# Patient Record
Sex: FEMALE | Race: WHITE | NOT HISPANIC OR LATINO | Employment: FULL TIME | URBAN - METROPOLITAN AREA
[De-identification: names, ages, dates, MRNs, and addresses within clinical notes are randomized per-mention and may not be internally consistent; named-entity substitution may affect disease eponyms.]

---

## 2017-01-11 ENCOUNTER — APPOINTMENT (OUTPATIENT)
Dept: LAB | Facility: HOSPITAL | Age: 57
End: 2017-01-11

## 2017-01-11 ENCOUNTER — OFFICE VISIT (OUTPATIENT)
Dept: FAMILY MEDICINE CLINIC | Facility: CLINIC | Age: 57
End: 2017-01-11

## 2017-01-11 VITALS
HEART RATE: 71 BPM | DIASTOLIC BLOOD PRESSURE: 78 MMHG | TEMPERATURE: 98 F | HEIGHT: 62 IN | SYSTOLIC BLOOD PRESSURE: 120 MMHG | WEIGHT: 190 LBS | OXYGEN SATURATION: 98 % | BODY MASS INDEX: 34.96 KG/M2

## 2017-01-11 DIAGNOSIS — M19.90 ARTHRITIS: ICD-10-CM

## 2017-01-11 DIAGNOSIS — Z00.00 ANNUAL VISIT FOR GENERAL ADULT MEDICAL EXAMINATION WITHOUT ABNORMAL FINDINGS: Primary | ICD-10-CM

## 2017-01-11 DIAGNOSIS — F41.9 ANXIETY: ICD-10-CM

## 2017-01-11 DIAGNOSIS — L50.2 URTICARIA DUE TO COLD: ICD-10-CM

## 2017-01-11 LAB
ALBUMIN SERPL-MCNC: 4.7 G/DL (ref 3.2–4.8)
ALBUMIN/GLOB SERPL: 1.6 G/DL (ref 1.5–2.5)
ALP SERPL-CCNC: 85 U/L (ref 25–100)
ALT SERPL W P-5'-P-CCNC: 33 U/L (ref 7–40)
ANION GAP SERPL CALCULATED.3IONS-SCNC: 14 MMOL/L (ref 3–11)
ARTICHOKE IGE QN: 159 MG/DL (ref 0–130)
AST SERPL-CCNC: 27 U/L (ref 0–33)
BILIRUB BLD-MCNC: NEGATIVE MG/DL
BILIRUB SERPL-MCNC: 0.8 MG/DL (ref 0.3–1.2)
BUN BLD-MCNC: 7 MG/DL (ref 9–23)
BUN/CREAT SERPL: 10 (ref 7–25)
CALCIUM SPEC-SCNC: 9.7 MG/DL (ref 8.7–10.4)
CHLORIDE SERPL-SCNC: 105 MMOL/L (ref 99–109)
CHOLEST SERPL-MCNC: 245 MG/DL (ref 0–200)
CLARITY, POC: CLEAR
CO2 SERPL-SCNC: 28 MMOL/L (ref 20–31)
COLOR UR: YELLOW
CREAT BLD-MCNC: 0.7 MG/DL (ref 0.6–1.3)
DEPRECATED RDW RBC AUTO: 39.9 FL (ref 37–54)
ERYTHROCYTE [DISTWIDTH] IN BLOOD BY AUTOMATED COUNT: 12.5 % (ref 11.3–14.5)
GFR SERPL CREATININE-BSD FRML MDRD: 86 ML/MIN/1.73
GLOBULIN UR ELPH-MCNC: 3 GM/DL
GLUCOSE BLD-MCNC: 86 MG/DL (ref 70–100)
GLUCOSE UR STRIP-MCNC: NEGATIVE MG/DL
HCT VFR BLD AUTO: 41 % (ref 34.5–44)
HCV AB SER DONR QL: NORMAL
HDLC SERPL-MCNC: 50 MG/DL (ref 40–60)
HGB BLD-MCNC: 13.8 G/DL (ref 11.5–15.5)
KETONES UR QL: NEGATIVE
LEUKOCYTE EST, POC: NEGATIVE
MCH RBC QN AUTO: 29.7 PG (ref 27–31)
MCHC RBC AUTO-ENTMCNC: 33.7 G/DL (ref 32–36)
MCV RBC AUTO: 88.2 FL (ref 80–99)
NITRITE UR-MCNC: NEGATIVE MG/ML
PH UR: 7 [PH] (ref 5–8)
PLATELET # BLD AUTO: 273 10*3/MM3 (ref 150–450)
PMV BLD AUTO: 9.3 FL (ref 6–12)
POTASSIUM BLD-SCNC: 4 MMOL/L (ref 3.5–5.5)
PROT SERPL-MCNC: 7.7 G/DL (ref 5.7–8.2)
PROT UR STRIP-MCNC: NEGATIVE MG/DL
RBC # BLD AUTO: 4.65 10*6/MM3 (ref 3.89–5.14)
RBC # UR STRIP: NEGATIVE /UL
SODIUM BLD-SCNC: 147 MMOL/L (ref 132–146)
SP GR UR: 1 (ref 1–1.03)
T4 SERPL-MCNC: 8.7 MCG/DL (ref 4.7–11.4)
TRIGL SERPL-MCNC: 215 MG/DL (ref 0–150)
TSH SERPL DL<=0.05 MIU/L-ACNC: 2.63 MIU/ML (ref 0.35–5.35)
UROBILINOGEN UR QL: NORMAL
WBC NRBC COR # BLD: 7.08 10*3/MM3 (ref 3.5–10.8)

## 2017-01-11 PROCEDURE — 81003 URINALYSIS AUTO W/O SCOPE: CPT | Performed by: FAMILY MEDICINE

## 2017-01-11 PROCEDURE — 86803 HEPATITIS C AB TEST: CPT | Performed by: FAMILY MEDICINE

## 2017-01-11 PROCEDURE — 36415 COLL VENOUS BLD VENIPUNCTURE: CPT | Performed by: FAMILY MEDICINE

## 2017-01-11 PROCEDURE — 99396 PREV VISIT EST AGE 40-64: CPT | Performed by: FAMILY MEDICINE

## 2017-01-11 PROCEDURE — 84436 ASSAY OF TOTAL THYROXINE: CPT | Performed by: FAMILY MEDICINE

## 2017-01-11 PROCEDURE — 80061 LIPID PANEL: CPT | Performed by: FAMILY MEDICINE

## 2017-01-11 PROCEDURE — 84443 ASSAY THYROID STIM HORMONE: CPT | Performed by: FAMILY MEDICINE

## 2017-01-11 PROCEDURE — 85027 COMPLETE CBC AUTOMATED: CPT | Performed by: FAMILY MEDICINE

## 2017-01-11 PROCEDURE — 80053 COMPREHEN METABOLIC PANEL: CPT | Performed by: FAMILY MEDICINE

## 2017-01-11 NOTE — PROGRESS NOTES
Subjective   Aidee Chiang is a 56 y.o. female    HPI Comments: Patient presents today for an annual physical examination.  She has a history of cold urticaria along with chronic allergic rhinitis.  Her cold urticaria has been significantly helped with daily see tach.  She has complaints today of anxiety and serious mood swings.  Unfortunately she is commuting to [place] for work every day and is also raising a grandchild which have added to her stress.  She complains of a tender spot in her right lower back and also pain in her hands at her first CMC joints bilaterally with occasional numbness of the first and second digits of the hands.    Anxiety   Symptoms include nervous/anxious behavior.       Back Pain     Carpal Tunnel   Associated symptoms include arthralgias.       The following portions of the patient's history were reviewed and updated as appropriate: allergies, current medications, past social history and problem list    Review of Systems   Constitutional: Negative.    HENT: Negative.    Eyes: Negative.    Respiratory: Negative.    Cardiovascular: Negative.    Gastrointestinal: Negative.    Endocrine: Negative.    Genitourinary: Negative.    Musculoskeletal: Positive for arthralgias and back pain.   Skin: Negative.    Allergic/Immunologic: Negative.    Neurological: Negative.    Hematological: Negative.    Psychiatric/Behavioral: Positive for agitation. The patient is nervous/anxious.    All other systems reviewed and are negative.      Objective     Vitals:    01/11/17 1330   BP: 120/78   Pulse: 71   Temp: 98 °F (36.7 °C)   SpO2: 98%       Physical Exam   Constitutional: She is oriented to person, place, and time. She appears well-developed and well-nourished.   HENT:   Head: Normocephalic and atraumatic.   Right Ear: External ear normal.   Left Ear: External ear normal.   Nose: Nose normal.   Mouth/Throat: Oropharynx is clear and moist.   Eyes: Conjunctivae and EOM are normal. Pupils are equal,  round, and reactive to light.   Neck: Normal range of motion. Neck supple. No thyromegaly present.   Cardiovascular: Normal rate, regular rhythm, normal heart sounds and intact distal pulses.    No murmur heard.  Pulmonary/Chest: Effort normal and breath sounds normal.   Abdominal: Soft. Bowel sounds are normal. She exhibits no mass. There is no tenderness.   Musculoskeletal: Normal range of motion. She exhibits no edema.        Right wrist: She exhibits tenderness and bony tenderness. She exhibits normal range of motion, no swelling and no crepitus.        Left wrist: She exhibits tenderness and bony tenderness. She exhibits normal range of motion, no swelling and no crepitus.   Lymphadenopathy:     She has no cervical adenopathy.   Neurological: She is alert and oriented to person, place, and time. She has normal reflexes. No cranial nerve deficit.   Skin: Skin is warm and dry.   Psychiatric: She has a normal mood and affect. Her behavior is normal.   Nursing note and vitals reviewed.      Assessment/Plan   Problem List Items Addressed This Visit     None      Visit Diagnoses     Annual visit for general adult medical examination without abnormal findings    -  Primary    Relevant Orders    POC Urinalysis Dipstick, Automated (Completed)    CBC (No Diff)    Hepatitis C Antibody    Lipid Panel    TSH    T4    Comprehensive Metabolic Panel    Anxiety        Relevant Medications    sertraline (ZOLOFT) 50 MG tablet    Arthritis        Urticaria due to cold            Patient is advised to try over-the-counter Aleve or generic equivalent once or twice a day for 2-3 weeks for her CMC arthritis and back discomfort.    Patient is counseled during today's visit regarding preventative health measures to include use of seatbelts, medication safety, healthy diet and regular exercise.

## 2017-03-09 DIAGNOSIS — F41.9 ANXIETY: ICD-10-CM

## 2017-04-19 ENCOUNTER — OFFICE VISIT (OUTPATIENT)
Dept: FAMILY MEDICINE CLINIC | Facility: CLINIC | Age: 57
End: 2017-04-19

## 2017-04-19 VITALS
SYSTOLIC BLOOD PRESSURE: 124 MMHG | WEIGHT: 192 LBS | HEIGHT: 62 IN | HEART RATE: 67 BPM | BODY MASS INDEX: 35.33 KG/M2 | DIASTOLIC BLOOD PRESSURE: 74 MMHG | TEMPERATURE: 97.2 F | OXYGEN SATURATION: 97 %

## 2017-04-19 DIAGNOSIS — M79.89 SWELLING OF BOTH LOWER EXTREMITIES: Primary | ICD-10-CM

## 2017-04-19 DIAGNOSIS — I83.893 VARICOSE VEINS OF BOTH LEGS WITH EDEMA: ICD-10-CM

## 2017-04-19 PROCEDURE — 99213 OFFICE O/P EST LOW 20 MIN: CPT | Performed by: FAMILY MEDICINE

## 2017-04-19 NOTE — PROGRESS NOTES
Subjective   Aidee Chiang is a 56 y.o. female    HPI Comments: Patient presents with swelling of the right lower extremity.  She has not had any trauma.  There is slight swelling but less pronounced in the left lower extremity.  She reports that this is a new symptom for her.  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  She does not have to commute to [place] everyday which is a good thing.    Leg Swelling   Pertinent negatives include no arthralgias, chest pain, chills, coughing, fatigue, fever, myalgias, numbness or weakness.       The following portions of the patient's history were reviewed and updated as appropriate: allergies, current medications, past social history and problem list    Review of Systems   Constitutional: Negative for chills, fatigue, fever and unexpected weight change.   Respiratory: Negative for cough, shortness of breath and wheezing.    Cardiovascular: Positive for leg swelling. Negative for chest pain and palpitations.   Genitourinary: Negative.    Musculoskeletal: Negative for arthralgias and myalgias.   Neurological: Negative for dizziness, weakness, light-headedness and numbness.       Objective     Vitals:    04/19/17 0808   BP: 124/74   Pulse: 67   Temp: 97.2 °F (36.2 °C)   SpO2: 97%       Physical Exam   Constitutional: She is oriented to person, place, and time. She appears well-developed and well-nourished.   HENT:   Head: Normocephalic and atraumatic.   Cardiovascular: Normal rate and regular rhythm.  Exam reveals no gallop.    No murmur heard.  Pulmonary/Chest: Effort normal and breath sounds normal. She has no wheezes. She has no rales.   Abdominal: Soft. She exhibits no mass. There is no tenderness.   Musculoskeletal:        Right lower leg: She exhibits swelling.   Patient has an area of swelling and fullness in the medial proximal area of both lower legs more pronounced on the right.  She appears to  have a significant area of varicosities underlying this area of swelling.   Neurological: She is alert and oriented to person, place, and time.   Skin: Skin is warm and dry.   Psychiatric: She has a normal mood and affect. Her behavior is normal.   Nursing note and vitals reviewed.      Assessment/Plan   Problem List Items Addressed This Visit     None      Visit Diagnoses     Swelling of both lower extremities    -  Primary    Relevant Orders    Duplex Venous Lower Extremity - Bilateral CAR    Varicose veins of both legs with edema        Relevant Orders    Duplex Venous Lower Extremity - Bilateral CAR

## 2017-04-25 ENCOUNTER — HOSPITAL ENCOUNTER (OUTPATIENT)
Dept: CARDIOLOGY | Facility: HOSPITAL | Age: 57
Discharge: HOME | End: 2017-04-25
Admitting: FAMILY MEDICINE

## 2017-04-25 DIAGNOSIS — I83.893 VARICOSE VEINS OF BOTH LEGS WITH EDEMA: ICD-10-CM

## 2017-04-25 DIAGNOSIS — M79.89 SWELLING OF BOTH LOWER EXTREMITIES: ICD-10-CM

## 2017-04-25 LAB
BH CV LOWER VASCULAR LEFT COMMON FEMORAL AUGMENT: NORMAL
BH CV LOWER VASCULAR LEFT COMMON FEMORAL COMPETENT: NORMAL
BH CV LOWER VASCULAR LEFT COMMON FEMORAL COMPRESS: NORMAL
BH CV LOWER VASCULAR LEFT COMMON FEMORAL PHASIC: NORMAL
BH CV LOWER VASCULAR LEFT COMMON FEMORAL SPONT: NORMAL
BH CV LOWER VASCULAR LEFT DISTAL FEMORAL COMPRESS: NORMAL
BH CV LOWER VASCULAR LEFT GASTRONEMIUS COMPRESS: NORMAL
BH CV LOWER VASCULAR LEFT GREATER SAPH AK COMPRESS: NORMAL
BH CV LOWER VASCULAR LEFT GREATER SAPH BK COMPRESS: NORMAL
BH CV LOWER VASCULAR LEFT MID FEMORAL AUGMENT: NORMAL
BH CV LOWER VASCULAR LEFT MID FEMORAL COMPETENT: NORMAL
BH CV LOWER VASCULAR LEFT MID FEMORAL COMPRESS: NORMAL
BH CV LOWER VASCULAR LEFT MID FEMORAL PHASIC: NORMAL
BH CV LOWER VASCULAR LEFT MID FEMORAL SPONT: NORMAL
BH CV LOWER VASCULAR LEFT PERONEAL COMPRESS: NORMAL
BH CV LOWER VASCULAR LEFT POPLITEAL AUGMENT: NORMAL
BH CV LOWER VASCULAR LEFT POPLITEAL COMPETENT: NORMAL
BH CV LOWER VASCULAR LEFT POPLITEAL COMPRESS: NORMAL
BH CV LOWER VASCULAR LEFT POPLITEAL PHASIC: NORMAL
BH CV LOWER VASCULAR LEFT POPLITEAL SPONT: NORMAL
BH CV LOWER VASCULAR LEFT POSTERIOR TIBIAL COMPRESS: NORMAL
BH CV LOWER VASCULAR LEFT PROXIMAL FEMORAL COMPRESS: NORMAL
BH CV LOWER VASCULAR LEFT SAPHENOFEMORAL JUNCTION AUGMENT: NORMAL
BH CV LOWER VASCULAR LEFT SAPHENOFEMORAL JUNCTION COMPETENT: NORMAL
BH CV LOWER VASCULAR LEFT SAPHENOFEMORAL JUNCTION COMPRESS: NORMAL
BH CV LOWER VASCULAR LEFT SAPHENOFEMORAL JUNCTION PHASIC: NORMAL
BH CV LOWER VASCULAR LEFT SAPHENOFEMORAL JUNCTION SPONT: NORMAL
BH CV LOWER VASCULAR RIGHT COMMON FEMORAL AUGMENT: NORMAL
BH CV LOWER VASCULAR RIGHT COMMON FEMORAL COMPETENT: NORMAL
BH CV LOWER VASCULAR RIGHT COMMON FEMORAL COMPRESS: NORMAL
BH CV LOWER VASCULAR RIGHT COMMON FEMORAL PHASIC: NORMAL
BH CV LOWER VASCULAR RIGHT COMMON FEMORAL SPONT: NORMAL
BH CV LOWER VASCULAR RIGHT DISTAL FEMORAL COMPRESS: NORMAL
BH CV LOWER VASCULAR RIGHT GASTRONEMIUS COMPRESS: NORMAL
BH CV LOWER VASCULAR RIGHT GREATER SAPH AK COMPRESS: NORMAL
BH CV LOWER VASCULAR RIGHT GREATER SAPH BK COMPRESS: NORMAL
BH CV LOWER VASCULAR RIGHT MID FEMORAL AUGMENT: NORMAL
BH CV LOWER VASCULAR RIGHT MID FEMORAL COMPETENT: NORMAL
BH CV LOWER VASCULAR RIGHT MID FEMORAL COMPRESS: NORMAL
BH CV LOWER VASCULAR RIGHT MID FEMORAL PHASIC: NORMAL
BH CV LOWER VASCULAR RIGHT MID FEMORAL SPONT: NORMAL
BH CV LOWER VASCULAR RIGHT PERONEAL COMPRESS: NORMAL
BH CV LOWER VASCULAR RIGHT POPLITEAL AUGMENT: NORMAL
BH CV LOWER VASCULAR RIGHT POPLITEAL COMPETENT: NORMAL
BH CV LOWER VASCULAR RIGHT POPLITEAL COMPRESS: NORMAL
BH CV LOWER VASCULAR RIGHT POPLITEAL PHASIC: NORMAL
BH CV LOWER VASCULAR RIGHT POPLITEAL SPONT: NORMAL
BH CV LOWER VASCULAR RIGHT POSTERIOR TIBIAL COMPRESS: NORMAL
BH CV LOWER VASCULAR RIGHT PROXIMAL FEMORAL COMPRESS: NORMAL
BH CV LOWER VASCULAR RIGHT SAPHENOFEMORAL JUNCTION AUGMENT: NORMAL
BH CV LOWER VASCULAR RIGHT SAPHENOFEMORAL JUNCTION COMPETENT: NORMAL
BH CV LOWER VASCULAR RIGHT SAPHENOFEMORAL JUNCTION COMPRESS: NORMAL
BH CV LOWER VASCULAR RIGHT SAPHENOFEMORAL JUNCTION PHASIC: NORMAL
BH CV LOWER VASCULAR RIGHT SAPHENOFEMORAL JUNCTION SPONT: NORMAL

## 2017-04-25 PROCEDURE — 93970 EXTREMITY STUDY: CPT | Performed by: INTERNAL MEDICINE

## 2017-04-25 PROCEDURE — 93970 EXTREMITY STUDY: CPT

## 2017-04-26 DIAGNOSIS — I83.893 VARICOSE VEINS OF BOTH LEGS WITH EDEMA: Primary | ICD-10-CM

## 2017-07-17 DIAGNOSIS — F41.9 ANXIETY: ICD-10-CM

## 2017-07-17 NOTE — TELEPHONE ENCOUNTER
Patient has new insurance and doesn't use mail order anymore. Needs new script sent to local pharmacy.

## 2018-01-31 ENCOUNTER — OFFICE VISIT (OUTPATIENT)
Dept: FAMILY MEDICINE CLINIC | Facility: CLINIC | Age: 58
End: 2018-01-31

## 2018-01-31 VITALS
TEMPERATURE: 99 F | HEIGHT: 62 IN | BODY MASS INDEX: 35.59 KG/M2 | DIASTOLIC BLOOD PRESSURE: 84 MMHG | OXYGEN SATURATION: 98 % | HEART RATE: 87 BPM | SYSTOLIC BLOOD PRESSURE: 118 MMHG | WEIGHT: 193.4 LBS

## 2018-01-31 DIAGNOSIS — M25.561 ACUTE PAIN OF RIGHT KNEE: Primary | ICD-10-CM

## 2018-01-31 PROCEDURE — 99213 OFFICE O/P EST LOW 20 MIN: CPT | Performed by: FAMILY MEDICINE

## 2018-01-31 NOTE — PROGRESS NOTES
Subjective   Aidee Chiang is a 57 y.o. female    HPI Comments: Patient presents today with pain in the right knee along with slight swelling.  The knee has been tender to palpation.  She reports that she was walking with her granddaughter and walking faster than usual when she felt something give or pop in her right knee.  She has had pain since then.  She has had long-standing mild intermittent pain in both knees but reports that this is significantly different.  She's had no previous serious knee injuries and no surgeries. Has appointment already scheduled with Ortho.    Knee Pain    Pertinent negatives include no numbness.       The following portions of the patient's history were reviewed and updated as appropriate: allergies, current medications, past social history and problem list    Review of Systems   Constitutional: Negative.    Musculoskeletal: Positive for arthralgias, gait problem, joint swelling and myalgias.   Skin: Negative.    Neurological: Negative for weakness and numbness.   Hematological: Negative for adenopathy. Does not bruise/bleed easily.       Objective     Vitals:    01/31/18 1153   BP: 118/84   Pulse: 87   Temp: 99 °F (37.2 °C)   SpO2: 98%       Physical Exam   Constitutional: She is oriented to person, place, and time. She appears well-developed and well-nourished.   Musculoskeletal:        Right knee: She exhibits decreased range of motion and swelling. She exhibits no effusion, no ecchymosis, no deformity, no erythema, no LCL laxity and no MCL laxity. Tenderness found. Medial joint line tenderness noted.   Neurological: She is alert and oriented to person, place, and time. She has normal reflexes.   Skin: Skin is warm and dry. No rash noted.   Psychiatric: She has a normal mood and affect. Her behavior is normal.   Nursing note and vitals reviewed.      Assessment/Plan   Problem List Items Addressed This Visit     None      Visit Diagnoses     Acute pain of right knee    -  Primary     Relevant Orders    MRI knee right  wo contrast

## 2018-02-07 ENCOUNTER — HOSPITAL ENCOUNTER (OUTPATIENT)
Dept: MRI IMAGING | Facility: HOSPITAL | Age: 58
Discharge: HOME | End: 2018-02-07
Admitting: FAMILY MEDICINE

## 2018-02-07 DIAGNOSIS — M25.561 ACUTE PAIN OF RIGHT KNEE: ICD-10-CM

## 2018-02-07 PROCEDURE — 73721 MRI JNT OF LWR EXTRE W/O DYE: CPT

## 2018-02-08 ENCOUNTER — TELEPHONE (OUTPATIENT)
Dept: FAMILY MEDICINE CLINIC | Facility: CLINIC | Age: 58
End: 2018-02-08

## 2018-02-08 NOTE — TELEPHONE ENCOUNTER
MRI report has not been confirmed but shows a medial meniscus tear.  Will need oral total referral.  Does she have a preference of the orthopedist?

## 2018-02-08 NOTE — TELEPHONE ENCOUNTER
----- Message from Martina Quinones sent at 2/8/2018  2:30 PM EST -----  Contact: PATIENT  SHE HAD MRI YESTERDAY AND WANTED TO KNOW IF THE RESULTS WERE BACK AND IF SOMEONE CAN CALL HER WITH THOSE RESULTS. THANKS [phone]

## 2018-03-01 ENCOUNTER — OFFICE VISIT (OUTPATIENT)
Dept: ORTHOPEDIC SURGERY | Facility: CLINIC | Age: 58
End: 2018-03-01

## 2018-03-01 VITALS
WEIGHT: 188.49 LBS | HEIGHT: 61 IN | HEART RATE: 90 BPM | DIASTOLIC BLOOD PRESSURE: 81 MMHG | BODY MASS INDEX: 35.59 KG/M2 | SYSTOLIC BLOOD PRESSURE: 163 MMHG

## 2018-03-01 DIAGNOSIS — M25.561 RIGHT KNEE PAIN, UNSPECIFIED CHRONICITY: Primary | ICD-10-CM

## 2018-03-01 DIAGNOSIS — S83.221A PERIPHERAL TEAR OF MEDIAL MENISCUS OF RIGHT KNEE AS CURRENT INJURY, INITIAL ENCOUNTER: ICD-10-CM

## 2018-03-01 DIAGNOSIS — M70.51 PES ANSERINUS BURSITIS OF BOTH KNEES: ICD-10-CM

## 2018-03-01 DIAGNOSIS — M70.52 PES ANSERINUS BURSITIS OF BOTH KNEES: ICD-10-CM

## 2018-03-01 PROCEDURE — 99203 OFFICE O/P NEW LOW 30 MIN: CPT | Performed by: ORTHOPAEDIC SURGERY

## 2018-03-01 NOTE — PROGRESS NOTES
[facility] Note    Subjective     Pain of the Right Knee (Right knee pain for several years with no known injury. Typically the pain is a throbbing pain. No pain at the moment, only a throbbing pain. Standing for long periods of time increases the pain.)      CURTIS Chiang is a 57 y.o. female. Patient's here for several weeks of right knee pain.  Taking one of her grandchildren to the doctor and felt sudden onset of right thigh and leg pain that did not allow her to walk.  She limped around for a couple of weeks after that.  She went to her PCP who ordered an MRI.  She is here for further evaluation and treatment.  Patient tells me her knee is much better over the last several days.  She hurts on the medial side of both knees.  She denies twisting pain or mechanical disturbance.     Past Medical History:   Diagnosis Date   • Allergic       Past Surgical History:   Procedure Laterality Date   • AXILLARY LYMPH NODE BIOPSY/EXCISION Right 2008   • LYMPHADENECTOMY     • TUBAL ABDOMINAL LIGATION     • WRIST GANGLION EXCISION        Family History   Problem Relation Age of Onset   • Cancer Mother    • Hypertension Mother    • Heart disease Mother    • Alzheimer's disease Father    • Hyperlipidemia Father    • Diabetes Father      Social History     Social History   • Marital status:      Spouse name: N/A   • Number of children: N/A   • Years of education: N/A     Occupational History   • Not on file.     Social History Main Topics   • Smoking status: Never Smoker   • Smokeless tobacco: Never Used   • Alcohol use No   • Drug use: No   • Sexual activity: Defer     Other Topics Concern   • Not on file     Social History Narrative          Current Outpatient Prescriptions on File Prior to Visit   Medication Sig Dispense Refill   • cetirizine (ZYRTEC ALLERGY) 10 MG tablet Take 10 mg by mouth Daily.     • sertraline (ZOLOFT) 50 MG tablet Take 1 tablet by mouth Daily. Take after  "supper 90 tablet 3     No current facility-administered medications on file prior to visit.       Allergies   Allergen Reactions   • Other         The following portions of the patient's history were reviewed and updated as appropriate: allergies, current medications, past family history, past medical history, past social history, past surgical history and problem list.    Review of Systems   Constitutional: Positive for fatigue.   HENT: Negative.    Eyes: Negative.    Respiratory: Negative.    Cardiovascular: Positive for leg swelling.   Gastrointestinal: Negative.    Endocrine: Negative.    Genitourinary: Negative.    Musculoskeletal: Positive for arthralgias.   Skin: Negative.    Allergic/Immunologic: Negative.    Neurological: Positive for numbness.   Hematological: Negative.    Psychiatric/Behavioral: Negative.         Objective      Physical Exam  /81  Pulse 90  Ht 154.9 cm (61\")  Wt 85.5 kg (188 lb 7.9 oz)  BMI 35.62 kg/m2    Body mass index is 35.62 kg/(m^2).    General  Mental Status - alert  General Appearance - cooperative, well groomed, not in acute distress  Orientation - Oriented X3  Build & Nutrition - well developed and well nourished  Posture - normal posture  Gait - normal gait     Integumentary  Global Assessment  Examination of related systems reveals - no lymphadenopathy  General Characteristics  Overall examination of the patient's skin reveals - no rashes, no evidence of scars, no suspicious lesions and no bruises.  Color - normal coloration of skin.    Ortho Exam  Peripheral Vascular:    Upper Extremity:   Inspection:  Left--no cyanotic nail beds Right--no cyanotic nail beds   Bilateral:  Pink nail beds with brisk capillary refill   Palpation:  Bilateral radial pulse normal    Musculoskeletal:  Global Assessment:  Overall assessment of Lower Extremity Muscle Strength and Tone:    Right quadriceps--5/5  Right hamstrings--5/5  Right tibialis anterior--5/5  Right gastroc " soleus--5/5  Right EHL--5/5    Lower Extremity:  Knee/Patella:  No digital clubbing or cyanosis.    Examination of right knee reveals:  Normal deep tendon reflexes, coordination, strength, tone, sensation.  No known fractures or deformities.    Inspection and Palpation:      Right knee:  Tenderness:  Over medial joint line more than over the hamstring insertions on the tibia bilaterally  Effusion:  1+  Crepitus:  none  Pulses:  2+  Ecchymosis:  None  Warmth:  None     ROM:  Right:  Extension:0    Flexion:135  Left:  Extension:0     Flexion:135    Instability:      Right:  Lachman Test:  Negative, Varus stress test negative, Valgus stress test negative   Anterior Drawer Test:  Negative, Posterior Drawer Test:  Negative    Deformities/Malalignments/Discrepancies:    Left:  none  Right:  none    Functional Testing:  Right:  Yogesh's test:  Negative  Patella grind test:  Negative  Q-angle:  Normal  Apprehension Sign:  Negative    Imaging/Studies  X-rays taken in the office today as well as an MRI from [facility] from 2/7/2018 are reviewed.  We have reviewed the report as well.  Patient has a small medial meniscal tear with a small perimeniscal cyst.  No significant degenerative changes are appreciated.    Assessment:  1. Right knee pain, unspecified chronicity    2. Peripheral tear of medial meniscus of right knee as current injury, initial encounter    3. Pes anserinus bursitis of both knees        Plan:  1. Patient's bursitis seems to be the most likely etiology of her pain  2. Continue over-the-counter medication as needed for discomfort  3. We have recommended some hamstring stretching  4. With regards to her meniscal tear, does not seem to be the etiology of her discomfort and I like to observe that for now.  5. Follow-up in 3 weeks to see which direction this is going.  If she is worse, PT may be helpful.      Medical Decision Making  Management Options : over-the-counter medicine  Data/Risk: radiology tests  and independent visualization of imaging, lab tests, or EMG/NCV    Nelson Bullock MD  03/01/18  2:23 PM

## 2018-11-28 ENCOUNTER — OFFICE VISIT (OUTPATIENT)
Dept: FAMILY MEDICINE CLINIC | Facility: CLINIC | Age: 58
End: 2018-11-28

## 2018-11-28 VITALS
DIASTOLIC BLOOD PRESSURE: 60 MMHG | TEMPERATURE: 97.6 F | SYSTOLIC BLOOD PRESSURE: 120 MMHG | WEIGHT: 167 LBS | HEIGHT: 61 IN | BODY MASS INDEX: 31.53 KG/M2 | OXYGEN SATURATION: 98 % | HEART RATE: 72 BPM

## 2018-11-28 DIAGNOSIS — E78.2 MIXED HYPERLIPIDEMIA: Primary | ICD-10-CM

## 2018-11-28 PROCEDURE — 99213 OFFICE O/P EST LOW 20 MIN: CPT | Performed by: FAMILY MEDICINE

## 2018-11-28 RX ORDER — INFLUENZA A VIRUS A/SINGAPORE/GP1908/2015 IVR-180A (H1N1) ANTIGEN (PROPIOLACTONE INACTIVATED), INFLUENZA A VIRUS A/SINGAPORE/INFIMH-16-0019/2016 IVR-186 (H3N2) ANTIGEN (PROPIOLACTONE INACTIVATED), INFLUENZA B VIRUS B/MARYLAND/15/2016 ANTIGEN (PROPIOLACTONE INACTIVATED), AND INFLUENZA B VIRUS B/PHUKET/3073/2013 BVR-1B ANTIGEN (PROPIOLACTONE INACTIVATED) 15; 15; 15; 15 UG/.5ML; UG/.5ML; UG/.5ML; UG/.5ML
INJECTION, SUSPENSION INTRAMUSCULAR
Refills: 0 | COMMUNITY
Start: 2018-11-07 | End: 2019-05-08

## 2018-11-28 RX ORDER — HEPATITIS A VACCINE 1440 [IU]/ML
INJECTION, SUSPENSION INTRAMUSCULAR
Refills: 0 | COMMUNITY
Start: 2018-11-07 | End: 2019-05-08

## 2018-11-28 NOTE — PROGRESS NOTES
Subjective   Aidee Chiang is a 58 y.o. female    Chief Complaint    High cholesterol      History of Present Illness   Patient referred by her gynecologist after recent lab work revealed a total cholesterol of 251 LDL cholesterol 164 HDL cholesterol 56 and triglycerides 155.  Patient has lost significant amount of weight on a Weight Watchers diet that she started a couple months ago and would like to continue dieting to see if she can get her cholesterol down.  I gave her a low cholesterol diet that she can hopefully incorporate with her Weight Watchers.  I also recommended she add fish oil to see if that would help.      The following portions of the patient's history were reviewed and updated as appropriate: allergies, current medications, past social history and problem list    Review of Systems   Constitutional: Negative for chills, fatigue and fever.   Respiratory: Negative for cough, chest tightness, shortness of breath and wheezing.    Cardiovascular: Negative for chest pain, palpitations and leg swelling.   Gastrointestinal: Negative for abdominal pain, nausea and vomiting.   Endocrine: Negative for polydipsia, polyphagia and polyuria.   Genitourinary: Negative for dysuria, frequency and urgency.   Musculoskeletal: Negative for arthralgias, back pain and myalgias.   Skin: Negative for color change and rash.   Neurological: Negative for weakness and headaches.   Hematological: Negative for adenopathy. Does not bruise/bleed easily.       Objective     Vitals:    11/28/18 1329   BP: 120/60   Pulse: 72   Temp: 97.6 °F (36.4 °C)   SpO2: 98%       Physical Exam   Constitutional: She is oriented to person, place, and time. She appears well-developed and well-nourished.   HENT:   Head: Normocephalic.   Mouth/Throat: Oropharynx is clear and moist.   Eyes: Conjunctivae are normal. Pupils are equal, round, and reactive to light.   Neck: Neck supple. No thyromegaly present.   Cardiovascular: Normal rate and regular  rhythm.   Pulmonary/Chest: Effort normal.   Abdominal: Soft. Bowel sounds are normal. There is no tenderness.   Lymphadenopathy:     She has no cervical adenopathy.   Neurological: She is alert and oriented to person, place, and time.   Skin: Skin is warm and dry. No rash noted.   Psychiatric: She has a normal mood and affect. Her behavior is normal.   Nursing note and vitals reviewed.      Assessment/Plan   Problem List Items Addressed This Visit     None      Visit Diagnoses     Mixed hyperlipidemia    -  Primary

## 2019-03-20 ENCOUNTER — OFFICE VISIT (OUTPATIENT)
Dept: FAMILY MEDICINE CLINIC | Facility: CLINIC | Age: 59
End: 2019-03-20

## 2019-03-20 ENCOUNTER — LAB REQUISITION (OUTPATIENT)
Dept: LAB | Facility: HOSPITAL | Age: 59
End: 2019-03-20

## 2019-03-20 VITALS
OXYGEN SATURATION: 98 % | DIASTOLIC BLOOD PRESSURE: 70 MMHG | HEIGHT: 61 IN | HEART RATE: 69 BPM | SYSTOLIC BLOOD PRESSURE: 122 MMHG | BODY MASS INDEX: 27.56 KG/M2 | WEIGHT: 146 LBS | TEMPERATURE: 97 F

## 2019-03-20 DIAGNOSIS — R63.4 WEIGHT LOSS: ICD-10-CM

## 2019-03-20 DIAGNOSIS — R42 DIZZINESS: ICD-10-CM

## 2019-03-20 DIAGNOSIS — R53.83 FATIGUE, UNSPECIFIED TYPE: ICD-10-CM

## 2019-03-20 DIAGNOSIS — E78.2 MIXED HYPERLIPIDEMIA: Primary | ICD-10-CM

## 2019-03-20 DIAGNOSIS — Z00.00 ROUTINE GENERAL MEDICAL EXAMINATION AT A HEALTH CARE FACILITY: ICD-10-CM

## 2019-03-20 LAB
ALBUMIN SERPL-MCNC: 4.91 G/DL (ref 3.2–4.8)
ALBUMIN/GLOB SERPL: 2.1 G/DL (ref 1.5–2.5)
ALP SERPL-CCNC: 72 U/L (ref 25–100)
ALT SERPL-CCNC: 18 U/L (ref 7–40)
AST SERPL-CCNC: 22 U/L (ref 0–33)
BILIRUB SERPL-MCNC: 0.8 MG/DL (ref 0.3–1.2)
BUN SERPL-MCNC: 10 MG/DL (ref 9–23)
BUN/CREAT SERPL: 13.3 (ref 7–25)
CALCIUM SERPL-MCNC: 9.6 MG/DL (ref 8.7–10.4)
CHLORIDE SERPL-SCNC: 109 MMOL/L (ref 99–109)
CHOLEST SERPL-MCNC: 240 MG/DL (ref 0–200)
CO2 SERPL-SCNC: 25 MMOL/L (ref 20–31)
CREAT SERPL-MCNC: 0.75 MG/DL (ref 0.6–1.3)
ERYTHROCYTE [DISTWIDTH] IN BLOOD BY AUTOMATED COUNT: 12.9 % (ref 11.3–14.5)
GLOBULIN SER CALC-MCNC: 2.3 GM/DL
GLUCOSE SERPL-MCNC: 84 MG/DL (ref 70–100)
HCT VFR BLD AUTO: 39.3 % (ref 34.5–44)
HDLC SERPL-MCNC: 57 MG/DL (ref 40–60)
HGB BLD-MCNC: 12.5 G/DL (ref 11.5–15.5)
IRON SERPL-MCNC: 81 MCG/DL (ref 50–175)
LDLC SERPL CALC-MCNC: 157 MG/DL (ref 0–100)
MCH RBC QN AUTO: 28.9 PG (ref 27–31)
MCHC RBC AUTO-ENTMCNC: 31.8 G/DL (ref 32–36)
MCV RBC AUTO: 91 FL (ref 80–99)
PLATELET # BLD AUTO: 253 10*3/MM3 (ref 150–450)
POTASSIUM SERPL-SCNC: 5 MMOL/L (ref 3.5–5.5)
PROT SERPL-MCNC: 7.2 G/DL (ref 5.7–8.2)
RBC # BLD AUTO: 4.32 10*6/MM3 (ref 3.89–5.14)
SODIUM SERPL-SCNC: 143 MMOL/L (ref 132–146)
T4 FREE SERPL-MCNC: 1.12 NG/DL (ref 0.89–1.76)
TRIGL SERPL-MCNC: 130 MG/DL (ref 0–150)
TSH SERPL DL<=0.005 MIU/L-ACNC: 2.08 MIU/ML (ref 0.35–5.35)
VLDLC SERPL CALC-MCNC: 26 MG/DL
WBC # BLD AUTO: 5.05 10*3/MM3 (ref 3.5–10.8)

## 2019-03-20 PROCEDURE — 99214 OFFICE O/P EST MOD 30 MIN: CPT | Performed by: FAMILY MEDICINE

## 2019-03-20 PROCEDURE — 36415 COLL VENOUS BLD VENIPUNCTURE: CPT | Performed by: FAMILY MEDICINE

## 2019-03-20 NOTE — PROGRESS NOTES
Subjective   Aidee Chiang is a 58 y.o. female    Chief Complaint    Fatigue  Dizziness  Hyperlipidemia  Weight loss, intentional    History of Present Illness  Patient presents today for follow-up regarding hyperlipidemia.  She reports symptoms of generalized fatigue, positional dizziness and intentional weight loss.  Since her last visit with me she has lost another 21 pounds.  She states that that seems to be excessive and thinks that the weight recorded in November was incorrect.  She reports that she drinks a great deal of water every day and does not feel dehydrated whatsoever.  She urinates frequently.  Overall she feels quite well.  She actually did a 3 K Walk last Saturday and donated blood 2 weeks ago.    The following portions of the patient's history were reviewed and updated as appropriate: allergies, current medications, past social history and problem list    Review of Systems   Constitutional: Positive for fatigue. Negative for chills, fever and unexpected weight change.   HENT: Negative.    Respiratory: Negative for cough, chest tightness, shortness of breath and wheezing.    Cardiovascular: Negative for chest pain, palpitations and leg swelling.   Gastrointestinal: Negative for abdominal pain, nausea and vomiting.   Endocrine: Negative for polydipsia, polyphagia and polyuria.   Genitourinary: Negative for dysuria, frequency and urgency.   Musculoskeletal: Negative for arthralgias, back pain and myalgias.   Skin: Negative for color change and rash.   Neurological: Positive for dizziness. Negative for weakness and headaches.   Hematological: Negative for adenopathy. Does not bruise/bleed easily.   Psychiatric/Behavioral: Negative for dysphoric mood. The patient is not nervous/anxious.        Objective     Vitals:    03/20/19 0804   BP: 122/70   Pulse: 69   Temp: 97 °F (36.1 °C)   SpO2: 98%       Physical Exam   Constitutional: She is oriented to person, place, and time. She appears well-developed  and well-nourished.   HENT:   Head: Normocephalic.   Mouth/Throat: Oropharynx is clear and moist.   Eyes: Conjunctivae are normal. Pupils are equal, round, and reactive to light.   Neck: Neck supple. No thyromegaly present.   Cardiovascular: Normal rate and regular rhythm.   Pulmonary/Chest: Effort normal and breath sounds normal.   Abdominal: Soft. Bowel sounds are normal. There is no tenderness.   Lymphadenopathy:     She has no cervical adenopathy.   Neurological: She is alert and oriented to person, place, and time.   Skin: Skin is warm and dry. No rash noted.   Psychiatric: She has a normal mood and affect. Her behavior is normal.   Nursing note and vitals reviewed.      Assessment/Plan   Problem List Items Addressed This Visit     None      Visit Diagnoses     Mixed hyperlipidemia    -  Primary    Relevant Orders    Lipid Panel (Completed)    Comprehensive Metabolic Panel (Completed)    Dizziness        Relevant Orders    CBC (No Diff) (Completed)    TSH (Completed)    T4, Free (Completed)    Iron (Completed)    Weight loss        Fatigue, unspecified type

## 2019-04-04 ENCOUNTER — TELEPHONE (OUTPATIENT)
Dept: FAMILY MEDICINE CLINIC | Facility: CLINIC | Age: 59
End: 2019-04-04

## 2020-07-07 ENCOUNTER — OFFICE VISIT (OUTPATIENT)
Dept: ORTHOPEDIC SURGERY | Facility: CLINIC | Age: 60
End: 2020-07-07

## 2020-07-07 VITALS — HEART RATE: 80 BPM | WEIGHT: 183 LBS | OXYGEN SATURATION: 97 % | HEIGHT: 62 IN | BODY MASS INDEX: 33.68 KG/M2

## 2020-07-07 DIAGNOSIS — M25.511 ACUTE PAIN OF RIGHT SHOULDER: Primary | ICD-10-CM

## 2020-07-07 DIAGNOSIS — IMO0002 DISORDER OF ROTATOR CUFF SYNDROME OF RIGHT SHOULDER AND ALLIED DISORDER: ICD-10-CM

## 2020-07-07 DIAGNOSIS — M19.011 ARTHRITIS OF RIGHT ACROMIOCLAVICULAR JOINT: ICD-10-CM

## 2020-07-07 PROCEDURE — 20605 DRAIN/INJ JOINT/BURSA W/O US: CPT | Performed by: ORTHOPAEDIC SURGERY

## 2020-07-07 PROCEDURE — 99214 OFFICE O/P EST MOD 30 MIN: CPT | Performed by: ORTHOPAEDIC SURGERY

## 2020-07-07 RX ORDER — TRIAMCINOLONE ACETONIDE 40 MG/ML
20 INJECTION, SUSPENSION INTRA-ARTICULAR; INTRAMUSCULAR
Status: COMPLETED | OUTPATIENT
Start: 2020-07-07 | End: 2020-07-07

## 2020-07-07 RX ORDER — LIDOCAINE HYDROCHLORIDE 10 MG/ML
1 INJECTION, SOLUTION EPIDURAL; INFILTRATION; INTRACAUDAL; PERINEURAL
Status: COMPLETED | OUTPATIENT
Start: 2020-07-07 | End: 2020-07-07

## 2020-07-07 RX ADMIN — LIDOCAINE HYDROCHLORIDE 1 ML: 10 INJECTION, SOLUTION EPIDURAL; INFILTRATION; INTRACAUDAL; PERINEURAL at 11:40

## 2020-07-07 RX ADMIN — TRIAMCINOLONE ACETONIDE 20 MG: 40 INJECTION, SUSPENSION INTRA-ARTICULAR; INTRAMUSCULAR at 11:40

## 2020-07-07 NOTE — PROGRESS NOTES
Procedure   Medium Joint Arthrocentesis: R acromioclavicular  Date/Time: 7/7/2020 11:40 AM  Consent given by: patient  Site marked: site marked  Timeout: Immediately prior to procedure a time out was called to verify the correct patient, procedure, equipment, support staff and site/side marked as required   Supporting Documentation  Indications: pain   Procedure Details  Location: shoulder - R acromioclavicular  Preparation: Patient was prepped and draped in the usual sterile fashion  Needle size: 25 G (short)  Approach: superior  Medications administered: 1 mL lidocaine PF 1% 1 %; 20 mg triamcinolone acetonide 40 MG/ML  Patient tolerance: patient tolerated the procedure well with no immediate complications

## 2020-07-07 NOTE — PROGRESS NOTES
"    [facility]        Subjective     CC: Pain of the Right Shoulder (07/02/2020- sudden pain while getting ready for bed, pain scale 1/10 today, much worse with certain movements- activity mod, bracing)      CURTIS Chiang is a 59 y.o. female.  Patient is here today for the sudden onset of right shoulder pain.  She was laying on her right shoulder on the couch and went to get up to go to bed and felt severe pain at the anterior aspect of the right shoulder.  This occurred on 7/2/2020.  She rates her discomfort is 1 out of 10 today.  She is tried activity modification and anti-inflammatories without a lot of benefit.        ROS:    Constiutional:Pt denies fever, chills, nausea, or vomiting.  MSK:as above        Objective      Past Medical History  Past Medical History:   Diagnosis Date   • Allergic          Physical Exam  Pulse 80   Ht 157.5 cm (62.01\")   Wt 83 kg (183 lb)   SpO2 97%   BMI 33.46 kg/m²     Body mass index is 33.46 kg/m².    Patient is well nourished and well developed.        Ortho Exam  Musculoskeletal   Upper Extremity   Right Shoulder     Inspection and Palpation:     Medial border scapular tenderness-none    AC Joint Tenderness -moderate to severe    Sensation is normal    Examination reveals no ecchymosis.        Strength and Tone:    Supraspinatus -4+ out of 5 with pain    External Rotators-5/5 with pain    Infraspinatus - 5/5    Subscapularis - 5/5 without pain    Deltoid - 5/5     Range of Motion      RightShoulder:    Internal Rotation: ROM - L4    External Rotation: AROM - 60 degrees    Elevation through flexion: AROM - 140 degrees        Impingement   Right shoulder    Garcia-Román impingement test positive    Neer impingement test negative     Functional Testing   Right shoulder    AC crossover adduction test positive    Speeds test negative    Uppercut test negative    O'Briens test negative    Drop arm sign negative    Apprehension relocation " negative      Imaging/Labs/EMG Reviewed:  Imaging Results (Last 24 Hours)     Procedure Component Value Units Date/Time    XR Shoulder 2+ View Right [[id number]] Resulted:  07/07/20 1120     Updated:  07/07/20 1120    Narrative:       Right Shoulder X-Ray    Indication: Pain    Study:  AP, axillary lateral, and scapular Y views    Comparison: None    Findings:  No acute fractures are visualized  No bony lesions are visualized.  Normal soft tissue appearance  AC joint: Moderate to severe joint space narrowing  Glenohumeral joint: Minimal joint space narrowing  Acromion type: 3      Impression:    No acute bony abnormalities noted  Type III acromion  Moderate to severe AC joint arthritis            Assessment    Assessment:  1. Acute pain of right shoulder    2. Arthritis of right acromioclavicular joint    3. Disorder of rotator cuff syndrome of right shoulder and allied disorder        Plan:  1. Recommend over the counter anti-inflammatories for pain and/or swelling  2. Acute right shoulder pain in the face of AC joint arthritis and a type II/3 acromion--patient appears to be maximally tender at the AC joint and it is my thought that she laid on her shoulder awkwardly and got the AC joint malalignment.  Plan will be for diagnostic and repeat injection into the right AC joint.  I will see her back in about 3 to 4 weeks and if she is not better, an MRI will be requested to look for the presence or absence of a rotator cuff tear.  Patient is weak with supraspinatus testing today.      Nelson Bullock MD  07/07/20  11:38

## 2021-09-22 PROCEDURE — U0004 COV-19 TEST NON-CDC HGH THRU: HCPCS | Performed by: NURSE PRACTITIONER

## 2021-10-25 ENCOUNTER — OFFICE VISIT (OUTPATIENT)
Dept: FAMILY MEDICINE CLINIC | Facility: CLINIC | Age: 61
End: 2021-10-25

## 2021-10-25 VITALS
TEMPERATURE: 96.6 F | WEIGHT: 188.2 LBS | HEART RATE: 77 BPM | SYSTOLIC BLOOD PRESSURE: 152 MMHG | OXYGEN SATURATION: 96 % | RESPIRATION RATE: 15 BRPM | BODY MASS INDEX: 34.63 KG/M2 | DIASTOLIC BLOOD PRESSURE: 88 MMHG | HEIGHT: 62 IN

## 2021-10-25 DIAGNOSIS — I10 PRIMARY HYPERTENSION: Primary | ICD-10-CM

## 2021-10-25 PROCEDURE — 99213 OFFICE O/P EST LOW 20 MIN: CPT | Performed by: FAMILY MEDICINE

## 2021-10-25 RX ORDER — METOPROLOL SUCCINATE 25 MG/1
25 TABLET, EXTENDED RELEASE ORAL DAILY
Qty: 30 TABLET | Refills: 3 | Status: SHIPPED | OUTPATIENT
Start: 2021-10-25 | End: 2022-01-26 | Stop reason: SDUPTHER

## 2021-10-25 NOTE — PROGRESS NOTES
Subjective   Aidee Chiang is a 61 y.o. female    Chief Complaint    Elevated blood pressure    History of Present Illness  The patient presents today for evaluation of elevated blood pressure. She reports her blood pressure has been too high. The patient also notes that she has buried 3 people in 2 months' time: Her , 1 of her children, and her mother. The patient went to the doctor approximately 5 weeks ago because she had a double ear infection and a sinus infection. She just went to Urgent Care and was told her heart rate was elevated. The patient said her blood pressure was elevated when she was there. She has not been on anything for her blood pressure. The patient takes Zyrtec, which works, but the days she does not take it she pays for it. Her blood pressure was 178/98 mmHg in 2021. The patient has been taking it for the last 4 to 5 days and it has not been too bad. She reports her blood pressure this morning was 152/88 mmHg. The patient reports she has had a lot of stress since her  . The patient has had to see a  and get everything taken care of, as well as pay for 3 funerals.    She asked what happens if her blood pressure drops too low when she is started on an antihypertensive. The patient notes she has an appointment for a physical in 2022.     The following portions of the patient's history were reviewed and updated as appropriate: allergies, current medications, past social history and problem list    Review of Systems   Constitutional: Negative for fatigue and unexpected weight change.   Respiratory: Negative for cough, chest tightness and shortness of breath.    Cardiovascular: Negative for chest pain, palpitations and leg swelling.   Gastrointestinal: Negative for nausea.   Skin: Negative for color change and rash.   Neurological: Negative for dizziness, syncope, weakness and headaches.       Objective     Vitals:    10/25/21 0802   BP: 152/88   Pulse: 77   Resp:  15   Temp: 96.6 °F (35.9 °C)   SpO2: 96%       Physical Exam  Vitals and nursing note reviewed.   Constitutional:       Appearance: She is well-developed.   Neck:      Vascular: No JVD.   Cardiovascular:      Rate and Rhythm: Normal rate and regular rhythm.      Pulses: Normal pulses.      Heart sounds: Normal heart sounds. No murmur heard.      Pulmonary:      Effort: Pulmonary effort is normal. No respiratory distress.      Breath sounds: Normal breath sounds.   Abdominal:      General: Bowel sounds are normal.      Palpations: Abdomen is soft.      Tenderness: There is no abdominal tenderness.   Skin:     General: Skin is warm and dry.         Assessment/Plan   Problems Addressed this Visit     None      Visit Diagnoses     Primary hypertension    -  Primary    Relevant Medications    metoprolol succinate XL (Toprol XL) 25 MG 24 hr tablet      Diagnoses       Codes Comments    Primary hypertension    -  Primary ICD-10-CM: I10  ICD-9-CM: 401.9         Part of this note may be an electronic transcription/translation of spoken language to printed text using the Dragon Dictation System.           Transcribed from ambient dictation for PRADEEP Bermudez MD by Rosangela Diamond.  10/25/21   10:00 EDT    I have personally performed the services described in this document as transcribed by the above individual, and it is both accurate and complete.  PRADEEP Bermudez MD  10/25/2021  20:57 EDT

## 2022-01-26 ENCOUNTER — OFFICE VISIT (OUTPATIENT)
Dept: FAMILY MEDICINE CLINIC | Facility: CLINIC | Age: 62
End: 2022-01-26

## 2022-01-26 VITALS
WEIGHT: 188.6 LBS | OXYGEN SATURATION: 97 % | SYSTOLIC BLOOD PRESSURE: 122 MMHG | BODY MASS INDEX: 34.71 KG/M2 | DIASTOLIC BLOOD PRESSURE: 70 MMHG | HEIGHT: 62 IN | TEMPERATURE: 97.4 F | HEART RATE: 89 BPM

## 2022-01-26 DIAGNOSIS — Z00.00 ANNUAL PHYSICAL EXAM: Primary | ICD-10-CM

## 2022-01-26 DIAGNOSIS — I10 PRIMARY HYPERTENSION: ICD-10-CM

## 2022-01-26 PROCEDURE — 99396 PREV VISIT EST AGE 40-64: CPT | Performed by: FAMILY MEDICINE

## 2022-01-26 RX ORDER — METOPROLOL SUCCINATE 25 MG/1
25 TABLET, EXTENDED RELEASE ORAL DAILY
Qty: 90 TABLET | Refills: 3 | Status: SHIPPED | OUTPATIENT
Start: 2022-01-26

## 2022-01-26 NOTE — PROGRESS NOTES
Subjective   Aidee Chiang is a 61 y.o. female    Chief Complaint    Annual physical exam  Hypertension  Ear infection    History of Present Illness  The patient is due for her annual physical and needs to get her lab study scheduled. She recently had a right ear infection and is currently on amoxicillin, so she would like to follow up on that. The patient is a follow-up regarding her blood pressure. She is now on metoprolol 25 mg once a day.    She reports she is doing well. The patient said some days are rougher than other days. She denies any trouble with the medication. The patient said she has been taking it at nighttime. She said she was trying to take it in the mornings, but it would upset her stomach. The patient said she takes it around 7:00 PM every night after she has dinner.    The patient had a colonoscopy in 06/2018 and was due a resurvey in 2021, but did not get it due to COVID-19. The patient said she had a mammogram in 10/2021 at [facility]. She had her Pap smear the same day. The patient said she tried to get it done, but she did not get that done the first couple of years during COVID-19. She said she went last year and got it all done.       The patient said she got the Martín and Martín COVID-19 vaccine. She has not had the booster because she is debating on it. The patient said she has not had the influenza vaccine yet this year.     She needs a refill of Toprol.     The following portions of the patient's history were reviewed and updated as appropriate: allergies, current medications, past social history and problem list    Review of Systems   Constitutional: Negative.  Negative for fatigue and unexpected weight change.   HENT: Negative.    Eyes: Negative.    Respiratory: Negative.  Negative for cough, chest tightness and shortness of breath.    Cardiovascular: Negative.  Negative for chest pain, palpitations and leg swelling.   Gastrointestinal: Negative.  Negative for nausea.    Endocrine: Negative.    Genitourinary: Negative.    Musculoskeletal: Negative.    Skin: Negative.  Negative for color change and rash.   Allergic/Immunologic: Negative.    Neurological: Negative.  Negative for dizziness, syncope, weakness and headaches.   Hematological: Negative.    Psychiatric/Behavioral: Negative.    All other systems reviewed and are negative.      Objective     Vitals:    01/26/22 1347   BP: 122/70   Pulse: 89   Temp: 97.4 °F (36.3 °C)   SpO2: 97%       Physical Exam  Vitals and nursing note reviewed.   Constitutional:       General: She is not in acute distress.     Appearance: Normal appearance. She is well-developed. She is not ill-appearing, toxic-appearing or diaphoretic.   HENT:      Head: Normocephalic and atraumatic.      Right Ear: External ear normal.      Left Ear: External ear normal.   Eyes:      Conjunctiva/sclera: Conjunctivae normal.      Pupils: Pupils are equal, round, and reactive to light.   Neck:      Thyroid: No thyromegaly.      Vascular: No carotid bruit or JVD.   Cardiovascular:      Rate and Rhythm: Normal rate and regular rhythm.      Pulses: Normal pulses.      Heart sounds: Normal heart sounds. No murmur heard.      Pulmonary:      Effort: Pulmonary effort is normal. No respiratory distress.      Breath sounds: Normal breath sounds.   Abdominal:      General: Bowel sounds are normal.      Palpations: Abdomen is soft. There is no mass.      Tenderness: There is no abdominal tenderness.   Musculoskeletal:         General: No swelling. Normal range of motion.      Cervical back: Normal range of motion and neck supple.   Lymphadenopathy:      Cervical: No cervical adenopathy.   Skin:     General: Skin is warm and dry.      Findings: No lesion or rash.   Neurological:      Mental Status: She is alert and oriented to person, place, and time.      Cranial Nerves: No cranial nerve deficit.      Sensory: No sensory deficit.      Motor: No weakness.      Coordination:  Coordination normal.      Gait: Gait normal.      Deep Tendon Reflexes: Reflexes are normal and symmetric.   Psychiatric:         Mood and Affect: Mood normal.         Behavior: Behavior normal.         Thought Content: Thought content normal.         Judgment: Judgment normal.         Assessment/Plan   Problems Addressed this Visit     None      Visit Diagnoses     Annual physical exam    -  Primary    Relevant Orders    CBC (No Diff)    Comprehensive Metabolic Panel    Lipid Panel    TSH    T4, Free    Primary hypertension        Relevant Medications    metoprolol succinate XL (Toprol XL) 25 MG 24 hr tablet    Other Relevant Orders    Comprehensive Metabolic Panel    TSH    T4, Free      Diagnoses       Codes Comments    Annual physical exam    -  Primary ICD-10-CM: Z00.00  ICD-9-CM: V70.0     Primary hypertension     ICD-10-CM: I10  ICD-9-CM: 401.9         Preventive medicine discussed, diet, exercise, healthy living discussed at length.  Discussed nutrition, physical activity, healthy weight, injury prevention, misuse of tobacco, alcohol and drugs, dental health, mental health, immunizations, screening    Part of this note may be an electronic transcription/translation of spoken language to printed text using the Dragon Dictation System.         Transcribed from ambient dictation for PRADEEP Bermudez MD by Rosangela Diamond.  01/26/22   14:48 EST    Patient verbalized consent to the visit recording.  I have personally performed the services described in this document as transcribed by the above individual, and it is both accurate and complete.  PRADEEP Bermudez MD  1/26/2022  16:55 EST

## 2022-01-27 ENCOUNTER — TELEPHONE (OUTPATIENT)
Dept: FAMILY MEDICINE CLINIC | Facility: CLINIC | Age: 62
End: 2022-01-27

## 2022-01-27 NOTE — TELEPHONE ENCOUNTER
Caller: Aidee Chiang    Relationship to patient: Self    Best call back number: [phone]    Patient is needing: PATIENT STATED THAT SHE IS POSITIVE FOR COVID AND WOULD LIKE TO KNOW IF THERE IS ANYTHING SHE WOULD NEED TO BE DOING    PLEASE ADVISE

## 2022-03-30 ENCOUNTER — OFFICE VISIT (OUTPATIENT)
Dept: FAMILY MEDICINE CLINIC | Facility: CLINIC | Age: 62
End: 2022-03-30

## 2022-03-30 VITALS
OXYGEN SATURATION: 98 % | DIASTOLIC BLOOD PRESSURE: 82 MMHG | BODY MASS INDEX: 35.26 KG/M2 | HEART RATE: 79 BPM | WEIGHT: 191.6 LBS | RESPIRATION RATE: 16 BRPM | SYSTOLIC BLOOD PRESSURE: 124 MMHG | TEMPERATURE: 96.8 F | HEIGHT: 62 IN

## 2022-03-30 DIAGNOSIS — E78.00 ELEVATED CHOLESTEROL: ICD-10-CM

## 2022-03-30 DIAGNOSIS — E66.3 OVERWEIGHT: ICD-10-CM

## 2022-03-30 DIAGNOSIS — I10 PRIMARY HYPERTENSION: Primary | ICD-10-CM

## 2022-03-30 PROCEDURE — 99213 OFFICE O/P EST LOW 20 MIN: CPT | Performed by: FAMILY MEDICINE

## 2022-09-27 ENCOUNTER — HOSPITAL ENCOUNTER (EMERGENCY)
Facility: HOSPITAL | Age: 62
Discharge: HOME | End: 2022-09-28
Attending: EMERGENCY MEDICINE | Admitting: EMERGENCY MEDICINE

## 2022-09-27 DIAGNOSIS — R07.89 CHEST WALL PAIN: ICD-10-CM

## 2022-09-27 DIAGNOSIS — M17.12 OSTEOARTHRITIS OF LEFT KNEE, UNSPECIFIED OSTEOARTHRITIS TYPE: ICD-10-CM
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
## 2022-09-27 PROCEDURE — 99282 EMERGENCY DEPT VISIT SF MDM: CPT

## 2022-09-28 ENCOUNTER — APPOINTMENT (OUTPATIENT)
Dept: GENERAL RADIOLOGY | Facility: HOSPITAL | Age: 62
End: 2022-09-28

## 2022-09-28 VITALS
RESPIRATION RATE: 16 BRPM | WEIGHT: 190 LBS | DIASTOLIC BLOOD PRESSURE: 58 MMHG | HEIGHT: 61 IN | OXYGEN SATURATION: 98 % | SYSTOLIC BLOOD PRESSURE: 114 MMHG | TEMPERATURE: 97.8 F | HEART RATE: 84 BPM | BODY MASS INDEX: 35.87 KG/M2

## 2022-09-28 PROCEDURE — 71045 X-RAY EXAM CHEST 1 VIEW: CPT

## 2022-09-28 PROCEDURE — 73560 X-RAY EXAM OF KNEE 1 OR 2: CPT

## 2022-09-28 RX ORDER — METHOCARBAMOL 750 MG/1
750 TABLET, FILM COATED ORAL 3 TIMES DAILY PRN
Qty: 15 TABLET | Refills: 0 | Status: SHIPPED | OUTPATIENT
Start: 2022-09-28 | End: 2022-10-19

## 2022-09-28 RX ORDER — IBUPROFEN 800 MG/1
800 TABLET ORAL
Qty: 15 TABLET | Refills: 0 | OUTPATIENT
Start: 2022-09-28 | End: 2023-02-15
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
## 2022-10-19 ENCOUNTER — OFFICE VISIT (OUTPATIENT)
Dept: FAMILY MEDICINE CLINIC | Facility: CLINIC | Age: 62
End: 2022-10-19

## 2022-10-19 VITALS
HEART RATE: 77 BPM | OXYGEN SATURATION: 97 % | WEIGHT: 193.5 LBS | BODY MASS INDEX: 36.53 KG/M2 | SYSTOLIC BLOOD PRESSURE: 122 MMHG | RESPIRATION RATE: 16 BRPM | TEMPERATURE: 96.5 F | DIASTOLIC BLOOD PRESSURE: 68 MMHG | HEIGHT: 61 IN

## 2022-10-19 DIAGNOSIS — R42 VERTIGO: ICD-10-CM

## 2022-10-19 DIAGNOSIS — J30.9 ALLERGIC RHINITIS, UNSPECIFIED SEASONALITY, UNSPECIFIED TRIGGER: ICD-10-CM

## 2022-10-19 DIAGNOSIS — E78.00 ELEVATED CHOLESTEROL: ICD-10-CM

## 2022-10-19 DIAGNOSIS — Z23 NEED FOR VACCINATION: ICD-10-CM

## 2022-10-19 DIAGNOSIS — I10 PRIMARY HYPERTENSION: Primary | ICD-10-CM

## 2022-10-19 PROBLEM — R50.9 FEVER: Status: ACTIVE | Noted: 2022-10-19

## 2022-10-19 PROBLEM — J01.90 ACUTE SINUSITIS: Status: ACTIVE | Noted: 2022-10-19

## 2022-10-19 PROBLEM — L50.9 URTICARIA: Status: ACTIVE | Noted: 2022-10-19

## 2022-10-19 PROCEDURE — 99214 OFFICE O/P EST MOD 30 MIN: CPT | Performed by: FAMILY MEDICINE

## 2022-10-19 PROCEDURE — 90686 IIV4 VACC NO PRSV 0.5 ML IM: CPT | Performed by: FAMILY MEDICINE

## 2022-10-19 PROCEDURE — 90471 IMMUNIZATION ADMIN: CPT | Performed by: FAMILY MEDICINE
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
## 2023-05-01 RX ORDER — METOPROLOL SUCCINATE 25 MG/1
25 TABLET, EXTENDED RELEASE ORAL DAILY
Qty: 90 TABLET | Refills: 3 | Status: SHIPPED | OUTPATIENT
Start: 2023-05-01

## 2023-05-01 NOTE — TELEPHONE ENCOUNTER
Caller: Aidee Chiang    Relationship: Self    Best call back number:[phone]  Requested Prescriptions:   Requested Prescriptions     Pending Prescriptions Disp Refills   • metoprolol succinate XL (Toprol XL) 25 MG 24 hr tablet 90 tablet 3     Sig: Take 1 tablet by mouth Daily.        Pharmacy where request should be sent: [facility] [id number] [address] [facility] [phone] [facility] [phone]      Last office visit with prescribing clinician: 10/19/2022   Last telemedicine visit with prescribing clinician: Visit date not found   Next office visit with prescribing clinician: Visit date not found     Does the patient have less than a 3 day supply:  [x] Yes  [] No    Would you like a call back once the refill request has been completed: [] Yes [x] No    If the office needs to give you a call back, can they leave a voicemail: [] Yes [x] No    Crispin Patel Rep   05/01/23 11:17 EDT

## 2024-03-15 DIAGNOSIS — M79.672 LEFT FOOT PAIN: Primary | ICD-10-CM

## 2024-03-20 ENCOUNTER — OFFICE VISIT (OUTPATIENT)
Age: 64
End: 2024-03-20
Payer: COMMERCIAL

## 2024-03-20 ENCOUNTER — HOSPITAL ENCOUNTER (OUTPATIENT)
Dept: GENERAL RADIOLOGY | Facility: HOSPITAL | Age: 64
Discharge: HOME | End: 2024-03-20
Admitting: STUDENT IN AN ORGANIZED HEALTH CARE EDUCATION/TRAINING PROGRAM
Payer: COMMERCIAL

## 2024-03-20 VITALS
BODY MASS INDEX: 34.65 KG/M2 | WEIGHT: 188.3 LBS | SYSTOLIC BLOOD PRESSURE: 130 MMHG | HEIGHT: 62 IN | DIASTOLIC BLOOD PRESSURE: 84 MMHG

## 2024-03-20 DIAGNOSIS — M19.072 ARTHRITIS OF LEFT MIDFOOT: Primary | ICD-10-CM

## 2024-03-20 DIAGNOSIS — M79.672 LEFT FOOT PAIN: ICD-10-CM

## 2024-03-20 DIAGNOSIS — M84.375A STRESS FRACTURE OF NAVICULAR BONE OF LEFT FOOT: ICD-10-CM

## 2024-03-20 PROCEDURE — 73630 X-RAY EXAM OF FOOT: CPT

## 2024-03-20 RX ORDER — IBUPROFEN 200 MG
200 TABLET ORAL AS NEEDED
COMMUNITY

## 2024-03-20 NOTE — PROGRESS NOTES
[facility] Office Visit     Office Visit       Date: 03/20/2024   Patient Name: Aidee Chiang  MRN: [id number]  YOB: 1960    Referring Physician: Referring, Self     Chief Complaint:   Chief Complaint   Patient presents with    Left Foot - Pain       History of Present Illness:   Aidee Chiang is a 63 y.o. female who presents with new problem of: left foot pain.  Onset: atraumatic and gradual in nature. The issue has been ongoing for 1 month(s). Pain is a 9/10 on the pain scale. Pain is described as aching and throbbing. Associated symptoms include pain. The pain is worse with walking, standing, sitting, and sleeping; nothing improve the pain. Previous treatments have included: NSAIDS.    Subjective   Review of Systems: Review of Systems   Constitutional:  Negative for chills, fever, unexpected weight gain and unexpected weight loss.   HENT:  Negative for congestion, postnasal drip and rhinorrhea.    Eyes:  Negative for blurred vision.   Respiratory:  Negative for shortness of breath.    Cardiovascular:  Negative for leg swelling.   Gastrointestinal:  Negative for abdominal pain, nausea and vomiting.   Genitourinary:  Negative for difficulty urinating.   Musculoskeletal:  Positive for arthralgias. Negative for gait problem, joint swelling and myalgias.   Skin:  Negative for skin lesions and wound.   Neurological:  Negative for dizziness, weakness, light-headedness and numbness.   Hematological:  Does not bruise/bleed easily.   Psychiatric/Behavioral:  Negative for depressed mood.         I have reviewed the following portions of the patient's history:History of Present Illness and review of systems.    Past Medical History:   Past Medical History:   Diagnosis Date    Allergic     Fracture of wrist 1965    Hypertension     Knee swelling 2021       Past Surgical History:   Past Surgical History:   Procedure Laterality Date    AXILLARY  "LYMPH NODE BIOPSY/EXCISION Right 2008    LYMPHADENECTOMY      TUBAL ABDOMINAL LIGATION      WRIST GANGLION EXCISION         Family History:   Family History   Problem Relation Age of Onset    Cancer Mother     Hypertension Mother     Heart disease Mother     Alzheimer's disease Father     Hyperlipidemia Father     Diabetes Father        Social History:   Social History     Socioeconomic History    Marital status:    Tobacco Use    Smoking status: Never    Smokeless tobacco: Never   Vaping Use    Vaping status: Never Used   Substance and Sexual Activity    Alcohol use: No    Drug use: No    Sexual activity: Defer       Medications:   Current Outpatient Medications:     cetirizine (zyrTEC) 10 MG tablet, Take 1 tablet by mouth Daily., Disp: , Rfl:     ibuprofen (ADVIL,MOTRIN) 200 MG tablet, Take 1 tablet by mouth As Needed for Mild Pain., Disp: , Rfl:     metoprolol succinate XL (Toprol XL) 25 MG 24 hr tablet, Take 1 tablet by mouth Daily., Disp: 90 tablet, Rfl: 3    Allergies: No Known Allergies    I reviewed the patient's chief complaint, history of present illness, review of systems, past medical history, surgical history, family history, social history, medications and allergy list.     Objective    Vital Signs:   Vitals:    03/20/24 0834   BP: 130/84   Weight: 85.4 kg (188 lb 4.8 oz)   Height: 157.5 cm (62\")     Body mass index is 34.44 kg/m².   BMI is >= 30 and <35. (Class 1 Obesity). The following options were offered after discussion;: exercise counseling/recommendations and nutrition counseling/recommendations     Patient reports that she is a non-smoker and has not ever been a smoker.  This behavior was applauded and she was encouraged to continue in smoking cessation.  We will continue to monitor at subsequent visits.    Ortho Exam:  Left foot: No erythema ecchymosis or swelling.  Tender to palpation over the dorsal aspect of the navicular and medial cuneiform.  Pain worsened by inversion eversion of " the foot.  No pain over the metatarsals or toes.  Sensation intact to light touch.  2+ dorsalis pedis pulse.  5/5 strength.    Results Review:   Imaging Results (Last 24 Hours)       ** No results found for the last 24 hours. **        I personally reviewed and interpreted the radiographs of the left foot from 3/20/2024.  No acute fracture or dislocation.  Minimal midfoot degenerative change identified.  No enthesophytes.    Procedures    Assessment / Plan    Assessment/Plan:   Diagnoses and all orders for this visit:    1. Arthritis of left midfoot (Primary)    2. Stress fracture of navicular bone of left foot    Pain over the left foot localized to the navicular for the last 1 month.  She has been walking approximately 1 mile per day but symptoms were present before she started doing that.  Pain associated with walking and at night.  No previous workup or treatment.  No previous history of foot injuries.  Radiographs show mild midfoot degenerative changes but no acute osseous abnormalities.  I recommend Voltaren gel.  She should look into orthotics to help with weightbearing activity.  She can take Tylenol or ibuprofen as needed.  If symptoms do not improve in 1 month, I would obtain MRI of her left foot to rule out a navicular stress fracture.  Otherwise, follow-up as needed.    Previous imaging studies reviewed: 3/20/2024-radiographs of the left foot.    Previous laboratory results reviewed: 2/7/2022-creatinine 0.65, EGFR 95.    Previous documentation reviewed: 11/28/2023-office visit-Marin Singh MD.    Follow Up:   Return if symptoms worsen or fail to improve.      Ambrose Argueta MD  [facility]

## 2024-04-29 ENCOUNTER — TELEPHONE (OUTPATIENT)
Dept: FAMILY MEDICINE CLINIC | Facility: CLINIC | Age: 64
End: 2024-04-29

## 2024-04-29 NOTE — TELEPHONE ENCOUNTER
Caller: Aidee Chiang    Relationship to patient: Self    Best call back number: [phone]     Chief complaint: ANNUAL    Type of visit: PHYSICAL    Requested date: JUNE     If rescheduling, when is the original appointment:      Additional notes:PATIENT HAS BEEN SCHEDULED WITH  MARYLOU GORE PA-C FOR HER PHYSICAL ON 6/6/24

## 2024-05-06 RX ORDER — METOPROLOL SUCCINATE 25 MG/1
25 TABLET, EXTENDED RELEASE ORAL DAILY
Qty: 90 TABLET | Refills: 0 | Status: SHIPPED | OUTPATIENT
Start: 2024-05-06

## 2024-06-06 ENCOUNTER — OFFICE VISIT (OUTPATIENT)
Dept: FAMILY MEDICINE CLINIC | Facility: CLINIC | Age: 64
End: 2024-06-06
Payer: COMMERCIAL

## 2024-06-06 ENCOUNTER — LAB (OUTPATIENT)
Dept: LAB | Facility: HOSPITAL | Age: 64
End: 2024-06-06
Payer: COMMERCIAL

## 2024-06-06 VITALS
HEART RATE: 66 BPM | OXYGEN SATURATION: 99 % | HEIGHT: 62 IN | SYSTOLIC BLOOD PRESSURE: 140 MMHG | WEIGHT: 198.8 LBS | DIASTOLIC BLOOD PRESSURE: 82 MMHG | BODY MASS INDEX: 36.58 KG/M2 | TEMPERATURE: 97.8 F

## 2024-06-06 DIAGNOSIS — I10 PRIMARY HYPERTENSION: ICD-10-CM

## 2024-06-06 DIAGNOSIS — E78.2 MIXED HYPERLIPIDEMIA: ICD-10-CM

## 2024-06-06 DIAGNOSIS — Z00.00 GENERAL MEDICAL EXAM: ICD-10-CM

## 2024-06-06 DIAGNOSIS — L50.9 URTICARIA: ICD-10-CM

## 2024-06-06 DIAGNOSIS — Z00.00 GENERAL MEDICAL EXAM: Primary | ICD-10-CM

## 2024-06-06 PROBLEM — J01.90 ACUTE SINUSITIS: Status: RESOLVED | Noted: 2022-10-19 | Resolved: 2024-06-06

## 2024-06-06 PROBLEM — R50.9 FEVER: Status: RESOLVED | Noted: 2022-10-19 | Resolved: 2024-06-06

## 2024-06-06 LAB
ALBUMIN SERPL-MCNC: 4.3 G/DL (ref 3.5–5.2)
ALBUMIN/GLOB SERPL: 1.4 G/DL
ALP SERPL-CCNC: 86 U/L (ref 39–117)
ALT SERPL W P-5'-P-CCNC: 20 U/L (ref 1–33)
ANION GAP SERPL CALCULATED.3IONS-SCNC: 10.9 MMOL/L (ref 5–15)
AST SERPL-CCNC: 20 U/L (ref 1–32)
BILIRUB SERPL-MCNC: 0.6 MG/DL (ref 0–1.2)
BUN SERPL-MCNC: 10 MG/DL (ref 8–23)
BUN/CREAT SERPL: 16.4 (ref 7–25)
CALCIUM SPEC-SCNC: 9 MG/DL (ref 8.6–10.5)
CHLORIDE SERPL-SCNC: 106 MMOL/L (ref 98–107)
CHOLEST SERPL-MCNC: 222 MG/DL (ref 0–200)
CO2 SERPL-SCNC: 23.1 MMOL/L (ref 22–29)
CREAT SERPL-MCNC: 0.61 MG/DL (ref 0.57–1)
DEPRECATED RDW RBC AUTO: 40.5 FL (ref 37–54)
EGFRCR SERPLBLD CKD-EPI 2021: 100 ML/MIN/1.73
ERYTHROCYTE [DISTWIDTH] IN BLOOD BY AUTOMATED COUNT: 12.7 % (ref 12.3–15.4)
GLOBULIN UR ELPH-MCNC: 3 GM/DL
GLUCOSE SERPL-MCNC: 89 MG/DL (ref 65–99)
HCT VFR BLD AUTO: 40.1 % (ref 34–46.6)
HDLC SERPL-MCNC: 47 MG/DL (ref 40–60)
HGB BLD-MCNC: 13.1 G/DL (ref 12–15.9)
LDLC SERPL CALC-MCNC: 144 MG/DL (ref 0–100)
LDLC/HDLC SERPL: 3 {RATIO}
MCH RBC QN AUTO: 28.4 PG (ref 26.6–33)
MCHC RBC AUTO-ENTMCNC: 32.7 G/DL (ref 31.5–35.7)
MCV RBC AUTO: 87 FL (ref 79–97)
PLATELET # BLD AUTO: 230 10*3/MM3 (ref 140–450)
PMV BLD AUTO: 9.5 FL (ref 6–12)
POTASSIUM SERPL-SCNC: 4.5 MMOL/L (ref 3.5–5.2)
PROT SERPL-MCNC: 7.3 G/DL (ref 6–8.5)
RBC # BLD AUTO: 4.61 10*6/MM3 (ref 3.77–5.28)
SODIUM SERPL-SCNC: 140 MMOL/L (ref 136–145)
TRIGL SERPL-MCNC: 170 MG/DL (ref 0–150)
TSH SERPL DL<=0.05 MIU/L-ACNC: 2.9 UIU/ML (ref 0.27–4.2)
VLDLC SERPL-MCNC: 31 MG/DL (ref 5–40)
WBC NRBC COR # BLD AUTO: 5.86 10*3/MM3 (ref 3.4–10.8)

## 2024-06-06 PROCEDURE — 80050 GENERAL HEALTH PANEL: CPT

## 2024-06-06 PROCEDURE — 93000 ELECTROCARDIOGRAM COMPLETE: CPT | Performed by: PHYSICIAN ASSISTANT

## 2024-06-06 PROCEDURE — 80061 LIPID PANEL: CPT

## 2024-06-06 PROCEDURE — 99396 PREV VISIT EST AGE 40-64: CPT | Performed by: PHYSICIAN ASSISTANT

## 2024-06-06 PROCEDURE — 36415 COLL VENOUS BLD VENIPUNCTURE: CPT

## 2024-06-06 RX ORDER — METOPROLOL SUCCINATE 25 MG/1
25 TABLET, EXTENDED RELEASE ORAL DAILY
Qty: 90 TABLET | Refills: 3 | Status: SHIPPED | OUTPATIENT
Start: 2024-06-06

## 2024-06-06 RX ORDER — AMOXICILLIN 500 MG/1
500 CAPSULE ORAL 2 TIMES DAILY
COMMUNITY
Start: 2024-06-05

## 2024-06-06 NOTE — PROGRESS NOTES
Subjective   Aidee Chiang is a 63 y.o. female  Annual Exam (Annual Physical, currently fasting ) and Hypertension (Follow up on bp, refill on metoprolol )      Hypertension      History of Present Illness  Patient presents today for a preventive medical visit.  Patient is here to determine screening labs and tests that are due and to determine immunization status as well.  Patient will be counseled regarding preventative medicine issues such as regular exercise and healthy diet as well.  Patient currently works at [employer], she will be retiring this year.      The patient is a 63-year-old female who is coming in for an annual physical.    The patient reports experiencing hives when exposed to cold air, even when placing her hands in cold water. This condition was diagnosed approximately 8 years ago by Dr. Bermudez, who recommended daily Zyrtec, which effectively managed her symptoms. However, she occasionally experiences discomfort when exposed to cold air. She finds the generic brand of Zyrtec effective. She also takes ibuprofen as needed for aches and pains, but does not require a prescription. She recently visited a dentist due to dental discomfort, who identified redness in her teeth, suspecting sinus drainage. An x-ray examination did not reveal any abnormalities. She has not yet undergone a mammogram this year. She undergoes her mammograms at [facility], which are forwarded to Dr. Jensen and Dr. Bermudez. She underwent a colonoscopy at [employer]. She plans to retire in 10/2024. She has been experiencing neck pain for the past 2 months, which occasionally disrupts her sleep. She denies any swelling. She describes the pain as tight and painful upon palpation, with the left side being more affected than the right. She is unable to move her shoulder or neck. She has not undergone physical therapy for an extended period. Her last eye examination was in 10/2023. She denies any gastrointestinal issues, bowel  movements, or bladder issues. She denies any abnormal moles. She maintains adequate hydration and applies lotion daily.   She has 12 grandchildren.    The following portions of the patient's history were reviewed and updated as appropriate: allergies, current medications, past social history and problem list    Review of Systems   Constitutional: Negative.    HENT: Negative.     Eyes: Negative.    Respiratory: Negative.     Cardiovascular: Negative.    Gastrointestinal: Negative.    Endocrine: Negative.    Genitourinary: Negative.    Musculoskeletal: Negative.    Skin: Negative.    Allergic/Immunologic: Negative.    Neurological: Negative.    Hematological: Negative.    Psychiatric/Behavioral: Negative.     All other systems reviewed and are negative.      Objective     Vitals:    06/06/24 0936   BP: 140/82   Pulse: 66   Temp: 97.8 °F (36.6 °C)   SpO2: 99%       Physical Exam  Vitals and nursing note reviewed.   Constitutional:       General: She is not in acute distress.     Appearance: Normal appearance. She is well-developed. She is obese. She is not ill-appearing, toxic-appearing or diaphoretic.      Comments: BMI36   HENT:      Head: Normocephalic and atraumatic.      Right Ear: External ear normal.      Left Ear: External ear normal.   Eyes:      Conjunctiva/sclera: Conjunctivae normal.      Pupils: Pupils are equal, round, and reactive to light.   Neck:      Thyroid: No thyromegaly.      Vascular: No carotid bruit or JVD.   Cardiovascular:      Rate and Rhythm: Normal rate and regular rhythm.      Pulses: Normal pulses.      Heart sounds: Normal heart sounds. No murmur heard.  Pulmonary:      Effort: Pulmonary effort is normal. No respiratory distress.      Breath sounds: Normal breath sounds.   Abdominal:      General: Bowel sounds are normal.      Palpations: Abdomen is soft. There is no mass.      Tenderness: There is no abdominal tenderness.   Musculoskeletal:         General: No swelling. Normal range of  motion.      Cervical back: Normal range of motion and neck supple.   Lymphadenopathy:      Cervical: No cervical adenopathy.   Skin:     General: Skin is warm and dry.      Findings: No lesion or rash.   Neurological:      Mental Status: She is alert and oriented to person, place, and time.      Cranial Nerves: No cranial nerve deficit.      Sensory: No sensory deficit.      Motor: No weakness.      Coordination: Coordination normal.      Gait: Gait normal.      Deep Tendon Reflexes: Reflexes are normal and symmetric.   Psychiatric:         Mood and Affect: Mood normal.         Behavior: Behavior normal.         Thought Content: Thought content normal.         Judgment: Judgment normal.       Physical Exam    Discussed preventative medicine issues with patient including regular exercise, healthy diet, stress reduction, adequate sleep and recommended age-appropriate screening studies.    ECG 12 Lead    Date/Time: 6/6/2024 10:24 AM  Performed by: Rachel Klein PA-C    Authorized by: Rachel Klein PA-C  Comparison: not compared with previous ECG   Rhythm: sinus rhythm  Rate: normal  BPM: 60  Conduction: conduction normal  ST Segments: ST segments normal  T Waves: T waves normal  QRS axis: normal  Other: no other findings    Clinical impression: normal ECG          Assessment & Plan   Assessment & Plan  1. Annual physical.  The patient's EKG yielded normal results, indicating a good cardiac rhythm. She is due for her routine blood work, cholesterol check, and mammogram. Her blood work has remained relatively stable over the past 3 years. She is not on thyroid medication, is not anemic, and does not have any vitamin deficiencies. Her iron levels are satisfactory. She maintains an active lifestyle. A referral care coordinator will be made for her to contact [facility] to obtain a copy of her mammogram on file. The RSV vaccine and shingles vaccine were recommended. A year's refill of metoprolol will be  sent to her pharmacy.      Follow-up  A follow-up visit is scheduled for 1 year from now.    Diagnoses and all orders for this visit:    1. General medical exam (Primary)  -     Lipid Panel; Future  -     Comprehensive metabolic panel; Future  -     TSH; Future  -     CBC (No Diff); Future    2. Urticaria  -     Lipid Panel; Future  -     Comprehensive metabolic panel; Future  -     TSH; Future  -     CBC (No Diff); Future    3. Primary hypertension  -     Lipid Panel; Future  -     Comprehensive metabolic panel; Future  -     TSH; Future  -     CBC (No Diff); Future    4. Mixed hyperlipidemia  -     Lipid Panel; Future  -     Comprehensive metabolic panel; Future  -     TSH; Future  -     CBC (No Diff); Future    Other orders  -     metoprolol succinate XL (TOPROL-XL) 25 MG 24 hr tablet; Take 1 tablet by mouth Daily.  Dispense: 90 tablet; Refill: 3  -     ECG 12 Lead         Patient or patient representative verbalized consent for the use of Ambient Listening during the visit with  Rachel Klein PA-C for chart documentation. 6/6/2024  10:25 EDT

## 2024-06-18 ENCOUNTER — TELEPHONE (OUTPATIENT)
Dept: FAMILY MEDICINE CLINIC | Facility: CLINIC | Age: 64
End: 2024-06-18
Payer: COMMERCIAL

## 2024-06-18 NOTE — TELEPHONE ENCOUNTER
PATIENT WAS SEEN ON 6/6 BY MARYLOU, SHE NEEDS A REFERRAL TO PT PUT IN, SHE ALSO MENTIONED HOW THE AVS DOES NOT MENTION ANYTHING ABOUT A REFERRAL TO PT.     PLEASE ADVISE AND GIVE PATIENT A CALL TO LET HER KNOW THIS HAS BEEN ONE.

## 2024-06-19 NOTE — TELEPHONE ENCOUNTER
Note has been addended, I believe she was needing a referral for physical therapy for chronic neck pain I have placed this order now to PT pros they should contact her tomorrow or early next week.

## 2024-07-11 ENCOUNTER — OFFICE VISIT (OUTPATIENT)
Dept: FAMILY MEDICINE CLINIC | Facility: CLINIC | Age: 64
End: 2024-07-11
Payer: COMMERCIAL

## 2024-07-11 ENCOUNTER — HOSPITAL ENCOUNTER (OUTPATIENT)
Dept: GENERAL RADIOLOGY | Facility: HOSPITAL | Age: 64
Discharge: HOME | End: 2024-07-11
Admitting: PHYSICIAN ASSISTANT
Payer: COMMERCIAL

## 2024-07-11 VITALS
HEART RATE: 76 BPM | WEIGHT: 201.2 LBS | TEMPERATURE: 98.2 F | HEIGHT: 62 IN | DIASTOLIC BLOOD PRESSURE: 72 MMHG | SYSTOLIC BLOOD PRESSURE: 120 MMHG | OXYGEN SATURATION: 99 % | BODY MASS INDEX: 37.02 KG/M2

## 2024-07-11 DIAGNOSIS — G89.29 CHRONIC NECK PAIN: ICD-10-CM

## 2024-07-11 DIAGNOSIS — M48.02 SPINAL STENOSIS, CERVICAL REGION: ICD-10-CM

## 2024-07-11 DIAGNOSIS — R68.84 JAW PAIN, NON-TMJ: ICD-10-CM

## 2024-07-11 DIAGNOSIS — M50.30 DDD (DEGENERATIVE DISC DISEASE), CERVICAL: ICD-10-CM

## 2024-07-11 DIAGNOSIS — M54.2 CHRONIC NECK PAIN: Primary | ICD-10-CM

## 2024-07-11 DIAGNOSIS — M54.2 NECK PAIN: Primary | ICD-10-CM

## 2024-07-11 DIAGNOSIS — G89.29 CHRONIC NECK PAIN: Primary | ICD-10-CM

## 2024-07-11 DIAGNOSIS — M54.2 CHRONIC NECK PAIN: ICD-10-CM

## 2024-07-11 PROCEDURE — 72050 X-RAY EXAM NECK SPINE 4/5VWS: CPT

## 2024-07-11 PROCEDURE — 99213 OFFICE O/P EST LOW 20 MIN: CPT | Performed by: PHYSICIAN ASSISTANT

## 2024-07-11 NOTE — PROGRESS NOTES
Subjective   Aidee Chiang is a 63 y.o. female  Oral Pain (Ongoing oral pain on the back right side of mouth, currently doing PT)      History of Present Illness  History of Present Illness  The patient is coming in today for evaluation of persistent pain in the back side of her mouth.    The patient reports persistent discomfort localized to the inside of her mouth, specifically her jaw. She denies the presence of any oral sores or a sore throat. She also denies any dental abnormalities. During her last physical examination in 06/2023, she reported right-sided neck and shoulder pain, which has since radiated to her jaw. She has undergone three sessions of physical therapy, which has provided some relief. However, the pain persists, albeit less severe than before. Her shoulder and upper back are showing signs of improvement. The therapist has identified knots on her upper back, which have been addressed by the therapist. The therapist does not believe the pain is related to temporomandibular joint (TMJ). This issue has been ongoing since 05/2023. She visited a dentist in 05/2023, who did not suspect any dental issues. An x-ray of the entire side of her mouth was conducted by her dentist, which yielded normal results. She avoids eating on her right side due to the pain, which exacerbates the pain. She has been sleeping on her right side. The pain radiates up to her jaw and occasionally up to her ear.    The following portions of the patient's history were reviewed and updated as appropriate: allergies, current medications, past social history and problem list    Review of Systems   HENT:  Negative for dental problem.    Respiratory:  Negative for shortness of breath.    Cardiovascular:  Negative for chest pain and palpitations.   Musculoskeletal:  Positive for arthralgias, neck pain and neck stiffness.   Neurological:  Negative for headaches.       Objective     Vitals:    07/11/24 0948   BP: 120/72   Pulse:  76   Temp: 98.2 °F (36.8 °C)   SpO2: 99%       Physical Exam  Vitals and nursing note reviewed.   Constitutional:       General: She is not in acute distress.     Appearance: Normal appearance. She is well-developed. She is not ill-appearing, toxic-appearing or diaphoretic.   HENT:      Head: Normocephalic and atraumatic.      Mouth/Throat:      Lips: Pink.      Mouth: Mucous membranes are moist. No injury or oral lesions.      Dentition: Normal dentition. No dental tenderness.   Cardiovascular:      Pulses: Normal pulses.   Musculoskeletal:         General: Tenderness (tender with myospasm right lateral cervical region and tender right lateral jaw line) present. No swelling, deformity or signs of injury.   Lymphadenopathy:      Cervical: No cervical adenopathy.   Skin:     General: Skin is warm and dry.      Coloration: Skin is not pale.      Findings: No erythema, lesion or rash.   Neurological:      Mental Status: She is alert and oriented to person, place, and time.      Motor: No abnormal muscle tone.      Coordination: Coordination normal.       Physical Exam      Assessment & Plan   Assessment & Plan  1. Jaw pain.  An x-ray of the cervical spine has been ordered.    Diagnoses and all orders for this visit:    1. Chronic neck pain (Primary)  -     XR Spine Cervical Complete 4 or 5 View; Future    2. Jaw pain, non-TMJ         Patient or patient representative verbalized consent for the use of Ambient Listening during the visit with  Rachel Klein PA-C for chart documentation. 7/11/2024  10:16 EDT

## 2024-07-24 ENCOUNTER — HOSPITAL ENCOUNTER (OUTPATIENT)
Dept: MRI IMAGING | Facility: HOSPITAL | Age: 64
Discharge: HOME | End: 2024-07-24
Admitting: PHYSICIAN ASSISTANT
Payer: COMMERCIAL

## 2024-07-24 DIAGNOSIS — M48.02 SPINAL STENOSIS, CERVICAL REGION: ICD-10-CM

## 2024-07-24 DIAGNOSIS — M54.2 NECK PAIN: ICD-10-CM

## 2024-07-24 DIAGNOSIS — M50.30 DDD (DEGENERATIVE DISC DISEASE), CERVICAL: ICD-10-CM

## 2024-07-24 PROCEDURE — 72141 MRI NECK SPINE W/O DYE: CPT
